# Patient Record
Sex: FEMALE
[De-identification: names, ages, dates, MRNs, and addresses within clinical notes are randomized per-mention and may not be internally consistent; named-entity substitution may affect disease eponyms.]

---

## 2018-05-22 ENCOUNTER — HOSPITAL ENCOUNTER (INPATIENT)
Dept: HOSPITAL 92 - ERS | Age: 23
LOS: 2 days | Discharge: HOME | DRG: 100 | End: 2018-05-24
Attending: INTERNAL MEDICINE | Admitting: INTERNAL MEDICINE
Payer: SELF-PAY

## 2018-05-22 VITALS — BODY MASS INDEX: 24.3 KG/M2

## 2018-05-22 DIAGNOSIS — R31.29: ICD-10-CM

## 2018-05-22 DIAGNOSIS — D72.829: ICD-10-CM

## 2018-05-22 DIAGNOSIS — G93.49: ICD-10-CM

## 2018-05-22 DIAGNOSIS — F41.9: ICD-10-CM

## 2018-05-22 DIAGNOSIS — Z90.49: ICD-10-CM

## 2018-05-22 DIAGNOSIS — Z91.14: ICD-10-CM

## 2018-05-22 DIAGNOSIS — G40.901: Primary | ICD-10-CM

## 2018-05-22 DIAGNOSIS — E87.6: ICD-10-CM

## 2018-05-22 DIAGNOSIS — E87.2: ICD-10-CM

## 2018-05-22 LAB
ALBUMIN SERPL BCG-MCNC: 3.7 G/DL (ref 3.5–5)
ALP SERPL-CCNC: 83 U/L (ref 40–150)
ALT SERPL W P-5'-P-CCNC: 11 U/L (ref 8–55)
ANION GAP SERPL CALC-SCNC: 23 MMOL/L (ref 10–20)
AST SERPL-CCNC: 15 U/L (ref 5–34)
BASOPHILS # BLD AUTO: 0.1 THOU/UL (ref 0–0.2)
BASOPHILS NFR BLD AUTO: 0.7 % (ref 0–1)
BILIRUB SERPL-MCNC: 0.2 MG/DL (ref 0.2–1.2)
BUN SERPL-MCNC: 5 MG/DL (ref 7–18.7)
CALCIUM SERPL-MCNC: 7.8 MG/DL (ref 7.8–10.44)
CARBAMAZEPINE SERPL-MCNC: 3 UG/ML (ref 4–12)
CHLORIDE SERPL-SCNC: 107 MMOL/L (ref 98–107)
CK SERPL-CCNC: 48 U/L (ref 29–168)
CO2 SERPL-SCNC: 10 MMOL/L (ref 22–29)
CREAT CL PREDICTED SERPL C-G-VRATE: 0 ML/MIN (ref 70–130)
CRYSTAL-AUWI FLAG: 0.1 (ref 0–15)
DRUG SCREEN CUTOFF: (no result)
EOSINOPHIL # BLD AUTO: 0.1 THOU/UL (ref 0–0.7)
EOSINOPHIL NFR BLD AUTO: 0.7 % (ref 0–10)
GLOBULIN SER CALC-MCNC: 3 G/DL (ref 2.4–3.5)
GLUCOSE SERPL-MCNC: 236 MG/DL (ref 70–105)
HEV IGM SER QL: 52 (ref 0–7.99)
HGB BLD-MCNC: 12.9 G/DL (ref 12–16)
HYALINE CASTS #/AREA URNS LPF: (no result) LPF
LYMPHOCYTES # BLD: 1.5 THOU/UL (ref 1.2–3.4)
LYMPHOCYTES NFR BLD AUTO: 11.5 % (ref 21–51)
MAGNESIUM SERPL-MCNC: 2.7 MG/DL (ref 1.6–2.6)
MCH RBC QN AUTO: 31.8 PG (ref 27–31)
MCV RBC AUTO: 95.3 FL (ref 81–99)
MEDTOX CONTROL LINE VALID?: (no result)
MEDTOX READER #: (no result)
MONOCYTES # BLD AUTO: 0.3 THOU/UL (ref 0.11–0.59)
MONOCYTES NFR BLD AUTO: 2.4 % (ref 0–10)
NEUTROPHILS # BLD AUTO: 11.2 THOU/UL (ref 1.4–6.5)
NEUTROPHILS NFR BLD AUTO: 84.8 % (ref 42–75)
PATHC CAST-AUWI FLAG: 0.29 (ref 0–2.49)
PLATELET # BLD AUTO: 304 THOU/UL (ref 130–400)
POTASSIUM SERPL-SCNC: 3.9 MMOL/L (ref 3.5–5.1)
PREGU CONTROL BACKGROUND?: (no result)
PREGU CONTROL BAR APPEAR?: YES
PROT UR STRIP.AUTO-MCNC: 100 MG/DL
RBC # BLD AUTO: 4.07 MILL/UL (ref 4.2–5.4)
RBC UR QL AUTO: (no result) HPF (ref 0–3)
SODIUM SERPL-SCNC: 136 MMOL/L (ref 136–145)
SP GR UR STRIP: 1.01 (ref 1–1.04)
SPERM-AUWI FLAG: 0 (ref 0–9.9)
WBC # BLD AUTO: 13.2 THOU/UL (ref 4.8–10.8)
WBC UR QL AUTO: (no result) HPF (ref 0–3)
YEAST-AUWI FLAG: 0 (ref 0–25)

## 2018-05-22 PROCEDURE — 84146 ASSAY OF PROLACTIN: CPT

## 2018-05-22 PROCEDURE — 87086 URINE CULTURE/COLONY COUNT: CPT

## 2018-05-22 PROCEDURE — 70450 CT HEAD/BRAIN W/O DYE: CPT

## 2018-05-22 PROCEDURE — 83735 ASSAY OF MAGNESIUM: CPT

## 2018-05-22 PROCEDURE — 80306 DRUG TEST PRSMV INSTRMNT: CPT

## 2018-05-22 PROCEDURE — 80048 BASIC METABOLIC PNL TOTAL CA: CPT

## 2018-05-22 PROCEDURE — 81025 URINE PREGNANCY TEST: CPT

## 2018-05-22 PROCEDURE — 96375 TX/PRO/DX INJ NEW DRUG ADDON: CPT

## 2018-05-22 PROCEDURE — 81015 MICROSCOPIC EXAM OF URINE: CPT

## 2018-05-22 PROCEDURE — 96365 THER/PROPH/DIAG IV INF INIT: CPT

## 2018-05-22 PROCEDURE — 81003 URINALYSIS AUTO W/O SCOPE: CPT

## 2018-05-22 PROCEDURE — 84443 ASSAY THYROID STIM HORMONE: CPT

## 2018-05-22 PROCEDURE — 51701 INSERT BLADDER CATHETER: CPT

## 2018-05-22 PROCEDURE — 80156 ASSAY CARBAMAZEPINE TOTAL: CPT

## 2018-05-22 PROCEDURE — 80053 COMPREHEN METABOLIC PANEL: CPT

## 2018-05-22 PROCEDURE — 83605 ASSAY OF LACTIC ACID: CPT

## 2018-05-22 PROCEDURE — 96376 TX/PRO/DX INJ SAME DRUG ADON: CPT

## 2018-05-22 PROCEDURE — 96361 HYDRATE IV INFUSION ADD-ON: CPT

## 2018-05-22 PROCEDURE — 82550 ASSAY OF CK (CPK): CPT

## 2018-05-22 PROCEDURE — 85025 COMPLETE CBC W/AUTO DIFF WBC: CPT

## 2018-05-22 PROCEDURE — 36415 COLL VENOUS BLD VENIPUNCTURE: CPT

## 2018-05-22 NOTE — HP
PRIMARY CARE PHYSICIAN:  [facility] call admission.

 

REASON FOR ADMISSION:  Status epilepticus.

 

HISTORY OF PRESENT ILLNESS:  A 23-year-old female who has underlying seizure disorder.  She was on Ke
ppra 1000 mg daily.  She ran out of medication on Sunday.  She did not take Keppra on Monday.  Last n
ight, she had a seizure.  She had total generalized tonic-clonic seizure witnessed by her  4 t
imes at home.  Paramedics was called and paramedics also had 2 seizures, one at the scene and when sh
e was brought to emergency room.  Paramedics gave her 4 mg of Ativan.  In the emergency room, she was
 loaded with Keppra.  She also had one seizure in the emergency room.  Subsequently, the patient was 
in postictal phase.  She was maintaining saturation.  The patient was relatively hypotensive and tach
ycardic.  The patient's  was present at bedside, who provided history.  The patient was not in
 position of providing any history.  She did not have any headache, fever, focal motor or sensory sym
ptoms.  She was in normal health up until last night when she went to bed.  She did not have any feve
r, chills, UTI symptoms, constipation or diarrhea.

 

REVIEW OF SYSTEMS:  All review of systems tried to review with the patient, but unable to review daquan
use of postictal phase and encephalopathy.

 

PAST MEDICAL HISTORY:  Epilepsy.

 

PAST SURGICAL HISTORY:  Cholecystectomy.

 

PAST PSYCHIATRIC HISTORY:  Anxiety disorder.

 

SOCIAL HISTORY:  The patient is  and lives at home with her .  No history of tobacco, a
lcohol or illicit drug abuse.

 

FAMILY HISTORY:  No strong family history of premature coronary artery disease, stroke or cancer.

 

ADDITIONAL INFORMATION:  The patient's  reports that her last seizure was in 2014 or 2015.  Si
nce then she was seizure free with medication.

 

ALLERGIES:  No known drug allergy.

 

CURRENT HOME MEDICATIONS:  Keppra 1000 mg p.o. daily.

 

EMERGENCY ROOM COURSE:  The patient is given Keppra 1 gram, Ativan 2 mg and subsequently Ativan 1 mg,
 IV fluid 2 liters.

 

PHYSICAL EXAMINATION:

VITAL SIGNS:  Currently, temperature 98.6, pulse 117, blood pressure 99/52, saturation 100% on facema
sk, weight 59 kg, respiratory rate 19.

GENERAL:  The patient is currently in postictal phase, hemodynamically stable.  No obvious acute dist
ress.

HEENT:  Head:  Normocephalic, atraumatic.  Eyes:  Pupils round, reactive to light.  Extraocular muscl
e intact.  ENT:  Oropharynx within normal limits.  Moist mucous membranes.  No oral lesion, no pharyn
geal erythema, no exudate.

NECK:  Supple, no JVD, no thyromegaly, no carotid bruit, no jugular venous distention.

LUNGS:  Clear to auscultation without any rhonchi or rales.

CARDIAC:  S1, S2 regular, tachycardia, no murmur, no gallop, no rub.

ABDOMEN:  Soft, bowel sounds present, nontender, nondistended.  No organomegaly, no mass, no suprapub
ic tenderness.

BACK:  Unremarkable.  No CVA tenderness.

EXTREMITIES:  Upper extremities:  Passive movement of all joints are normal.  Lower extremities:  Pas
sive movement of all joints are normal.

NEUROLOGIC:  The patient is in postictal phase.  She moves all 4 limbs.  Grossly nonfocal neurologica
l examination.  Detailed neurological examination is not possible because of encephalopathy.

SKIN:  No skin rash.

HEMATOLOGIC:  No lymphadenopathy.

PSYCHIATRIC:  Unable to assess at this point.

 

SIGNIFICANT LABS:  EKG monitor showing sinus tachycardia.  CT brain based on my review, no acute intr
acranial process.  CBC:  WBC 13.3, hemoglobin 12.9, platelet 304.  BMP:  Sodium 136, potassium 3.9, c
hloride 107, carbon dioxide 10, BUN 5, anion gap 23, creatinine 0.91, glucose 236, calcium 7.8.  LFT:
  AST 15, ALT 11, alkaline phosphatase 83, albumin 3.7.  TSH 2.78.  CK 48, magnesium 2.7.  Prolactin 
level 149.9.  Lactic acid 13.8.  Urinalysis is suggestive of blood, moderate.

 

ASSESSMENT AND PLAN:

1.  Acute encephalopathy, likely due to postictal phase.

2.  Status epilepticus.  The patient had back to back several seizures at home and currently in posti
ctal phase.  The patient ran out her Keppra, currently loaded with Keppra in the emergency room.  Jefry
rology will be consulted.  We will closely monitor in CCU for airway monitoring.  Currently, she does
 not need any intubation.  She is maintaining saturation very well on nasal cannula.  We will continu
e with the Keppra 1000 mg IV twice daily.  Further decision will defer to Neurology.  Seizure precaut
ion will be exhibited while in hospital.

3.  Anion gap metabolic acidosis, likely due to lactic acidosis secondary to status epilepticus.  The
 patient will be given IV fluid.  Does not have any source of infection.  We will repeat lactic acid 
level tomorrow.

4.  Leukocytosis, likely related with seizure.

5.  Microscopic hematuria.  We will send urine culture to rule out any infection.  At this point, we 
will avoid starting any antibiotic therapy.

6.  Deep venous thrombosis prophylaxis, Lovenox 40 mg subcutaneously daily.

7.  Gastrointestinal prophylaxis, Pepcid 20 mg IV b.i.d.

8.  Code status:  The patient is FULL CODE.  The patient's  is surrogate decision maker.

 

Disposition plan based on clinical course.  We will monitor in CCU for airway monitoring and we will 
also monitor with the cardiac monitor.  Magnesium and pregnancy level will be checked.  Prolactin miguel a
cked, which is already high.  The patient will be given IV fluid.  Currently, we will keep her n.p.o.
, but whenever she is more alert, then we will resume her regular diet.

 

Plan of care discussed with the patient's  at bedside in the emergency room.

## 2018-05-22 NOTE — CON
DATE OF CONSULTATION:  05/22/2018

 

This is a 23-year-old female.  Her  is at the bedside, gave adequate history.  He says that hi
s wife has had seizure activity since childhood.  She normally takes Keppra 500 twice a day.  She ran
 out of her medicine on Sunday, supposed to get it filled today when she started seizing early in the
 morning and had 5 seizures by that time she eventually arrived to the ER room.

 

She has received a total of 7 mg of Ativan and at least 1000 mg of Keppra IV.

 

She is now in the ICU.  I am being consulted regarding ICU stay.

 

 states that patient does not smoke or drink.  She is healthy, does not have a primary care ph
ysician, has not seen a neurologist for a period of time.

 

PAST MEDICAL HISTORY:  Pertinent for seizure disorders.  No diabetes or hypertension.

 

PAST SURGICAL HISTORY:  Cholecystectomy.  

 

ALCOHOL:  None.  

 

TOBACCO:  None

 

CHRONIC MEDICATIONS:  Keppra 1000 mg apparently once a day.

 

ALLERGIES:  None. 

 

FAMILY HISTORY:  Unremarkable.  

 

SOCIAL HISTORY:  She is a housewife.

 

REVIEW OF SYSTEMS:  Otherwise, 10 points negative.

 

PHYSICAL EXAMINATION:  

GENERAL:  The patient is lethargic, but arousable, appears to be in no distress.

VITAL SIGNS:  Pulse 93, blood pressure 97/73, sats 100%, respiratory 18.

CHEST:  Chest revealed decreased breath sounds without any wheezing.

CARDIAC:  Normal S1, S2, no gallops.

ABDOMEN:  Soft, no mass.

NEUROLOGIC:  Pupils equal.  She moves all 4 extremities.

 

LABORATORY DATA:  White count 13,000, H&H 12 and 38, platelet count 305.  Electrolytes are normal.  L
actic acid 3.8.  She had a prolactin level which was 149.  It was normal.

 

Chest x-ray was normal.  She had a CT done of the head which shows no acute hemorrhage or masses.

 

IMPRESSION:  Seizure activity, status post IV Keppra and IV Ativan.

 

PLAN:  Patient reinstated her home medication of Keppra 1000 mg twice a day.  Supportive care, IV flu
ids.

 

Continue observation in the ICU until she wakes up when stable.

 

Critical care time 40 minutes.

## 2018-05-22 NOTE — CT
PRELIMINARY REPORT/[facility]/EMERGENCY AFTER

HOURS PROCEDURE: 

 

 

EXAM:

CT Head Without Intravenous Contrast

 

EXAM DATE/TIME:

Exam ordered 5/22/2018 5:14 AM

 

CLINICAL HISTORY:

23 years old, female; Signs and symptoms; Other: Seizure; Patient HX: 24 yo f presents to ed with int
ermitted seizures that started at 3 am this morning. Ems reports pt has had a total of 5 seizures, 3 
while with ems. Ems reports seizures last 20 seconds each. Ems reports history of seizures. Pt is cur
rently unconscious.

 

TECHNIQUE:

Axial computed tomography images of the head/brain without intravenous contrast.

 

COMPARISON:

No relevant prior studies available.

 

FINDINGS:

Brain: Normal. No hemorrhage. No significant white matter disease. No edema.

Ventricles: Normal. No ventriculomegaly.

Bones/joints: Normal. No acute fracture.

Soft tissues: Normal.

Sinuses: Unremarkable as visualized. No acute sinusitis.

Mastoid air cells: Unremarkable as visualized. No mastoid effusion.

 

IMPRESSION:

No acute intracranial hemorrhage.

 

 

Thank you for allowing us to participate in the care of your patient.

Dictated and Authenticated by: Herve Durham MD

05/22/2018 6:29 AM Central Time ([place] & [place])

 

 

 

FINAL REPORT 

EMERGENCY AFTER HOURS BRAIN CT WITHOUT IV CONTRAST:

 

Date:  05/22/18 

Time:  0516 hours

 

COMPARISON:  

07/26/15. 

 

IMPRESSION: 

No mass or bleed, or other acute process. Stable from prior study. 

 

Report in agreement with preliminary report given on-call by [facility]. 

 

 

 

 

POS: OFF

## 2018-05-23 LAB
ANION GAP SERPL CALC-SCNC: 9 MMOL/L (ref 10–20)
BASOPHILS # BLD AUTO: 0 THOU/UL (ref 0–0.2)
BASOPHILS NFR BLD AUTO: 0.2 % (ref 0–1)
BUN SERPL-MCNC: (no result) MG/DL (ref 7–18.7)
CALCIUM SERPL-MCNC: 8.2 MG/DL (ref 7.8–10.44)
CHLORIDE SERPL-SCNC: 108 MMOL/L (ref 98–107)
CO2 SERPL-SCNC: 24 MMOL/L (ref 22–29)
CREAT CL PREDICTED SERPL C-G-VRATE: 130 ML/MIN (ref 70–130)
EOSINOPHIL # BLD AUTO: 0 THOU/UL (ref 0–0.7)
EOSINOPHIL NFR BLD AUTO: 0.4 % (ref 0–10)
GLUCOSE SERPL-MCNC: 95 MG/DL (ref 70–105)
HGB BLD-MCNC: 13 G/DL (ref 12–16)
LYMPHOCYTES # BLD: 1.6 THOU/UL (ref 1.2–3.4)
LYMPHOCYTES NFR BLD AUTO: 24.7 % (ref 21–51)
MCH RBC QN AUTO: 32.6 PG (ref 27–31)
MCV RBC AUTO: 91.7 FL (ref 81–99)
MONOCYTES # BLD AUTO: 0.6 THOU/UL (ref 0.11–0.59)
MONOCYTES NFR BLD AUTO: 9.8 % (ref 0–10)
NEUTROPHILS # BLD AUTO: 4.2 THOU/UL (ref 1.4–6.5)
NEUTROPHILS NFR BLD AUTO: 64.9 % (ref 42–75)
PLATELET # BLD AUTO: 233 THOU/UL (ref 130–400)
POTASSIUM SERPL-SCNC: 2.8 MMOL/L (ref 3.5–5.1)
RBC # BLD AUTO: 3.99 MILL/UL (ref 4.2–5.4)
SODIUM SERPL-SCNC: 138 MMOL/L (ref 136–145)
WBC # BLD AUTO: 6.5 THOU/UL (ref 4.8–10.8)

## 2018-05-23 RX ADMIN — Medication SCH: at 20:04

## 2018-05-23 RX ADMIN — Medication SCH ML: at 08:25

## 2018-05-23 NOTE — PRG
DATE OF SERVICE:  05/23/2018

 

SUBJECTIVE:  Awake, alert, responsive, no further seizure activity.

 

OBJECTIVE:

VITAL SIGNS:  Blood pressure is 107/70, sats 100% on room air, temperature 98.

GENERAL:  Awake, alert, responsive.

NEUROLOGIC:  Moves all 4 extremities.  No headache, no seizure activity.

CHEST:  No wheezing or crackles.

CARDIAC:  Normal S1 and S2.no gallops no murmers.

 

LABORATORY DATA:  Potassium is 2.8, otherwise electrolytes are normal and CBC 
unremarkable.

 

IMPRESSION:  Seizure activity.  Following stoppage of her seizure medication.

 

PLAN:  She can be transfer out of the ICU.

 

Pulmonary will follow at a distance.

 

MIKHAIL

## 2018-05-23 NOTE — PDOC.PN
- Subjective


Encounter Start Date: 05/23/18


Encounter Start Time: 07:30


-: old records requested/rev





Patient seen and examined for status epilepticus. She is not tolerating IV 

potassium, no seizure since admission. No new complaints. No overnight events





- Objective


Resuscitation Status: 


 











Resuscitation Status           FULL:Full Resuscitation














MAR Reviewed: Yes


Vital Signs & Weight: 


 Vital Signs (12 hours)











  Temp Pulse Resp Pulse Ox


 


 05/23/18 08:00  98.5 F  85  20  100


 


 05/23/18 04:00  99.2 F   


 


 05/23/18 00:00  98 F   








 Weight











Weight                         132 lb 11.492 oz











 Most Recent Monitor Data











Heart Rate from ECG            85


 


NIBP                           107/70


 


NIBP BP-Mean                   89


 


Respiration from ECG           14


 


SpO2                           100














I&O: 


 











 05/22/18 05/23/18 05/24/18





 06:59 06:59 06:59


 


Intake Total  1515 240


 


Output Total  2000 0


 


Balance  -485 240











Result Diagrams: 


 05/23/18 04:36





 05/23/18 04:36


EKG Reviewed by me: Yes (nsr)





Phys Exam





- Physical Examination


Constitutional: NAD


HEENT: PERRLA, moist MMs, sclera anicteric


Neck: no JVD, supple


Respiratory: no wheezing, no rales, no rhonchi


Cardiovascular: RRR, no significant murmur, no rub


Gastrointestinal: soft, non-tender, no distention, positive bowel sounds


Musculoskeletal: no edema, pulses present


Neurological: non-focal, normal sensation, moves all 4 limbs


Psychiatric: normal affect, A&O x 3


Skin: no rash, normal turgor





Dx/Plan


(1) Encephalopathy acute


Code(s): G93.40 - ENCEPHALOPATHY, UNSPECIFIED   Status: Resolved   Comment: due 

to post ictal phase, resolved now   





(2) Hypokalemia


Code(s): E87.6 - HYPOKALEMIA   Status: Acute   





(3) Status epilepticus


Code(s): G40.901 - EPILEPSY, UNSP, NOT INTRACTABLE, WITH STATUS EPILEPTICUS   

Status: Acute   Comment: controlled   





(4) Anxiety


Code(s): F41.9 - ANXIETY DISORDER, UNSPECIFIED   Status: Chronic   





(5) Epilepsy


Code(s): G40.909 - EPILEPSY, UNSP, NOT INTRACTABLE, WITHOUT STATUS EPILEPTICUS 

  Status: Chronic   





(6) Lactic acidosis


Code(s): E87.2 - ACIDOSIS   Status: Resolved   





(7) Leucocytosis


Code(s): D72.829 - ELEVATED WHITE BLOOD CELL COUNT, UNSPECIFIED   Status: 

Resolved   





- Plan


cont current plan of care





* transfer to stroke floor


* replace oral potassium


* repeat labs tomorrow


* no need of monitor


* will observe one more day, if no recurrent seizure, will consider discharge 

tomorrow


* repeat labs tomorrow


* medication reviewed as below


* symptomatic treatment


* seizure precaution.


* as per neurology, on tegretol 200 mg po bid, keppra 1500 mg po bid








Review of Systems





- Review of Systems


Eyes: negative: Pain, Vision Change, Conjunctivae Inflammation, Eyelid 

Inflammation, Redness, Other


ENT: negative: Ear Pain, Ear Discharge, Nose Pain, Nose Discharge, Nose 

Congestion, Mouth Pain, Mouth Swelling, Throat Pain, Throat Swelling, Other


Respiratory: negative: Cough, Dry, Shortness of Breath, Hemoptysis, SOB with 

Excertion, Pleuritic Pain, Sputum, Wheezing


Cardiovascular: negative: chest pain, palpitations, orthopnea, paroxysmal 

nocturnal dyspnea, edema, light headedness, other


Gastrointestinal: negative: Nausea, Vomiting, Abdominal Pain, Diarrhea, 

Constipation, Melena, Hematochezia, Other


Genitourinary: negative: Dysuria, Frequency, Incontinence, Hematuria, Retention

, Other


Musculoskeletal: negative: Neck Pain, Shoulder Pain, Arm Pain, Back Pain, Hand 

Pain, Leg Pain, Foot Pain, Other


Skin: negative: Rash, Lesions, Kip, Bruising, Other





- Medications/Allergies


Allergies/Adverse Reactions: 


 Allergies











Allergy/AdvReac Type Severity Reaction Status Date / Time


 


No Known Drug Allergies Allergy   Verified 05/06/14 03:17











Medications: 


 Current Medications





Acetaminophen (Tylenol)  650 mg PO Q4H PRN


   PRN Reason: Headache/Fever or Pain


Acetaminophen (Tylenol)  650 mg MS Q4H PRN


   PRN Reason: Headache/Fever or Pain


Al Hydroxide/Mg Hydroxide (Maalox)  30 ml PO Q6H PRN


   PRN Reason: Heartburn  or Indigestion


Bisacodyl (Dulcolax)  10 mg MS Q24H PRN


   PRN Reason: Constipation


Carbamazepine (Tegretol)  200 mg PO BID [facility]


   Last Admin: 05/23/18 08:24 Dose:  200 mg


Enoxaparin Sodium (Lovenox)  40 mg SC 0900 [facility]


   Last Admin: 05/23/18 08:25 Dose:  40 mg


Famotidine (Pepcid)  20 mg PO BID [facility]


   Last Admin: 05/23/18 08:24 Dose:  20 mg


Sodium Chloride (Normal Saline 0.9%)  1,000 mls @ 75 mls/hr IV .[id number] [facility]


   Last Admin: 05/22/18 23:38 Dose:  1,000 mls


Levetiracetam (Keppra)  1,500 mg PO BID [facility]


   Last Admin: 05/23/18 08:24 Dose:  1,500 mg


Loperamide HCl (Imodium)  2 mg PO PRN PRN


   PRN Reason: Diarrhea/Loose Stools


Magnesium Hydroxide (Milk Of Magnesium)  30 ml PO DAILYPRN PRN


   PRN Reason: Constipation


Ondansetron HCl (Zofran Odt)  4 mg PO Q6H PRN


   PRN Reason: Nausea/Vomiting


Ondansetron HCl (Zofran)  4 mg IVP Q6H PRN


   PRN Reason: Nausea/Vomiting


Potassium Chloride (K-Dur)  20 meq PO TID-[facility]


   Stop: 05/23/18 17:01


   Last Admin: 05/23/18 08:24 Dose:  20 meq


Sodium Chloride (Flush - Normal Saline)  10 ml IVF Q12HR [facility]


   Last Admin: 05/23/18 08:25 Dose:  10 ml


Sodium Chloride (Flush - Normal Saline)  10 ml IVF PRN PRN


   PRN Reason: Saline Flush

## 2018-05-24 VITALS — TEMPERATURE: 98 F | SYSTOLIC BLOOD PRESSURE: 100 MMHG | DIASTOLIC BLOOD PRESSURE: 66 MMHG

## 2018-05-24 LAB
ANION GAP SERPL CALC-SCNC: 9 MMOL/L (ref 10–20)
BUN SERPL-MCNC: 5 MG/DL (ref 7–18.7)
CALCIUM SERPL-MCNC: 8.5 MG/DL (ref 7.8–10.44)
CHLORIDE SERPL-SCNC: 108 MMOL/L (ref 98–107)
CO2 SERPL-SCNC: 25 MMOL/L (ref 22–29)
CREAT CL PREDICTED SERPL C-G-VRATE: 128 ML/MIN (ref 70–130)
GLUCOSE SERPL-MCNC: 94 MG/DL (ref 70–105)
POTASSIUM SERPL-SCNC: 3.8 MMOL/L (ref 3.5–5.1)
SODIUM SERPL-SCNC: 138 MMOL/L (ref 136–145)

## 2018-05-24 RX ADMIN — Medication SCH: at 08:51

## 2018-05-24 NOTE — PDOC.PN
- Subjective


Encounter Start Date: 05/24/18


Encounter Start Time: 07:10





Patient seen and examined for seizure. No new complaints. No overnight events





- Objective


Resuscitation Status: 


 











Resuscitation Status           FULL:Full Resuscitation














MAR Reviewed: Yes


Vital Signs & Weight: 


 Vital Signs (12 hours)











  Temp Pulse Resp BP Pulse Ox


 


 05/24/18 07:50  98.0 F  86  16  100/66  98


 


 05/24/18 04:10   83   94/58 L 


 


 05/24/18 04:00  97.8 F  85  18  85/60 L  99


 


 05/24/18 00:12     96/56 L 


 


 05/24/18 00:00  98.1 F  93  18  84/55 L  98








 Weight











Admit Weight                   2.123 oz


 


Weight                         132 lb 11.492 oz











 Most Recent Monitor Data











Heart Rate from ECG            82


 


NIBP                           97/71


 


NIBP BP-Mean                   76


 


Respiration from ECG           20


 


SpO2                           100














I&O: 


 











 05/23/18 05/24/18 05/25/18





 06:59 06:59 06:59


 


Intake Total 1515 2530 


 


Output Total 2000 275 


 


Balance -485 2255 











Result Diagrams: 


 05/23/18 04:36





 05/24/18 04:28


EKG Reviewed by me: Yes (nsr)





Phys Exam





- Physical Examination


Constitutional: NAD


HEENT: PERRLA, moist MMs, sclera anicteric


Neck: no JVD, supple


Respiratory: no wheezing, no rales, no rhonchi


Cardiovascular: RRR, no significant murmur, no rub


Gastrointestinal: soft, non-tender, no distention, positive bowel sounds


Musculoskeletal: no edema, pulses present


Neurological: non-focal, normal sensation, moves all 4 limbs


Psychiatric: normal affect, A&O x 3


Skin: no rash, normal turgor





Dx/Plan


(1) Encephalopathy acute


Code(s): G93.40 - ENCEPHALOPATHY, UNSPECIFIED   Status: Resolved   Comment: due 

to post ictal phase, resolved now   





(2) Hypokalemia


Code(s): E87.6 - HYPOKALEMIA   Status: Resolved   





(3) Status epilepticus


Code(s): G40.901 - EPILEPSY, UNSP, NOT INTRACTABLE, WITH STATUS EPILEPTICUS   

Status: Resolved   Comment: controlled   





(4) Anxiety


Code(s): F41.9 - ANXIETY DISORDER, UNSPECIFIED   Status: Chronic   





(5) Epilepsy


Code(s): G40.909 - EPILEPSY, UNSP, NOT INTRACTABLE, WITHOUT STATUS EPILEPTICUS 

  Status: Chronic   





(6) Lactic acidosis


Code(s): E87.2 - ACIDOSIS   Status: Resolved   





(7) Leucocytosis


Code(s): D72.829 - ELEVATED WHITE BLOOD CELL COUNT, UNSPECIFIED   Status: 

Resolved   





- Plan


cont current plan of care





* medication reviewed as below


* symptomatic treatment


* see discharge javier.








Review of Systems





- Review of Systems


Constitutional: negative: fever, chills, sweats, weakness, malaise, other


Eyes: negative: Pain, Vision Change, Conjunctivae Inflammation, Eyelid 

Inflammation, Redness, Other


ENT: negative: Ear Pain, Ear Discharge, Nose Pain, Nose Discharge, Nose 

Congestion, Mouth Pain, Mouth Swelling, Throat Pain, Throat Swelling, Other


Respiratory: negative: Cough, Dry, Shortness of Breath, Hemoptysis, SOB with 

Excertion, Pleuritic Pain, Sputum, Wheezing


Cardiovascular: negative: chest pain, palpitations, orthopnea, paroxysmal 

nocturnal dyspnea, edema, light headedness, other


Gastrointestinal: negative: Nausea, Vomiting, Abdominal Pain, Diarrhea, 

Constipation, Melena, Hematochezia, Other


Genitourinary: negative: Dysuria, Frequency, Incontinence, Hematuria, Retention

, Other


Musculoskeletal: negative: Neck Pain, Shoulder Pain, Arm Pain, Back Pain, Hand 

Pain, Leg Pain, Foot Pain, Other


Skin: negative: Rash, Lesions, Kip, Bruising, Other


Neurological: negative: Weakness, Numbness, Incoordination, Change in Speech, 

Confusion, Seizures, Other





- Medications/Allergies


Allergies/Adverse Reactions: 


 Allergies











Allergy/AdvReac Type Severity Reaction Status Date / Time


 


No Known Drug Allergies Allergy   Verified 05/06/14 03:17











Medications: 


 Current Medications





Acetaminophen (Tylenol)  650 mg PO Q4H PRN


   PRN Reason: Headache/Fever or Pain


Acetaminophen (Tylenol)  650 mg MI Q4H PRN


   PRN Reason: Headache/Fever or Pain


Al Hydroxide/Mg Hydroxide (Maalox)  30 ml PO Q6H PRN


   PRN Reason: Heartburn  or Indigestion


Bisacodyl (Dulcolax)  10 mg MI Q24H PRN


   PRN Reason: Constipation


Carbamazepine (Tegretol)  200 mg PO BID [facility]


   Last Admin: 05/24/18 08:50 Dose:  200 mg


Enoxaparin Sodium (Lovenox)  40 mg SC 0900 [facility]


   Last Admin: 05/24/18 08:50 Dose:  40 mg


Famotidine (Pepcid)  20 mg PO BID [facility]


   Last Admin: 05/24/18 08:50 Dose:  20 mg


Sodium Chloride (Normal Saline 0.9%)  1,000 mls @ 75 mls/hr IV .[id number] [facility]


   Last Admin: 05/24/18 04:01 Dose:  Not Given


Levetiracetam (Keppra)  1,500 mg PO BID [facility]


   Last Admin: 05/24/18 08:50 Dose:  1,500 mg


Loperamide HCl (Imodium)  2 mg PO PRN PRN


   PRN Reason: Diarrhea/Loose Stools


Magnesium Hydroxide (Milk Of Magnesium)  30 ml PO DAILYPRN PRN


   PRN Reason: Constipation


Ondansetron HCl (Zofran Odt)  4 mg PO Q6H PRN


   PRN Reason: Nausea/Vomiting


Ondansetron HCl (Zofran)  4 mg IVP Q6H PRN


   PRN Reason: Nausea/Vomiting


Sodium Chloride (Flush - Normal Saline)  10 ml IVF Q12HR [facility]


   Last Admin: 05/24/18 08:51 Dose:  Not Given


Sodium Chloride (Flush - Normal Saline)  10 ml IVF PRN PRN


   PRN Reason: Saline Flush

## 2018-11-18 ENCOUNTER — HOSPITAL ENCOUNTER (EMERGENCY)
Dept: HOSPITAL 92 - ERS | Age: 23
Discharge: HOME | End: 2018-11-18
Payer: SELF-PAY

## 2018-11-18 DIAGNOSIS — Z76.0: Primary | ICD-10-CM

## 2018-11-18 DIAGNOSIS — Z79.899: ICD-10-CM

## 2018-11-18 DIAGNOSIS — G40.909: ICD-10-CM

## 2018-11-18 DIAGNOSIS — F41.9: ICD-10-CM

## 2018-11-18 PROCEDURE — 99281 EMR DPT VST MAYX REQ PHY/QHP: CPT

## 2018-11-25 ENCOUNTER — HOSPITAL ENCOUNTER (EMERGENCY)
Dept: HOSPITAL 92 - ERS | Age: 23
Discharge: HOME | End: 2018-11-25
Payer: SELF-PAY

## 2018-11-25 DIAGNOSIS — Z79.899: ICD-10-CM

## 2018-11-25 DIAGNOSIS — R56.9: Primary | ICD-10-CM

## 2018-11-25 DIAGNOSIS — F41.9: ICD-10-CM

## 2018-11-25 PROCEDURE — 99283 EMERGENCY DEPT VISIT LOW MDM: CPT

## 2019-11-04 ENCOUNTER — HOSPITAL ENCOUNTER (EMERGENCY)
Dept: HOSPITAL 92 - ERS | Age: 24
Discharge: HOME | End: 2019-11-04
Payer: SELF-PAY

## 2019-11-04 DIAGNOSIS — J01.90: ICD-10-CM

## 2019-11-04 DIAGNOSIS — F41.9: ICD-10-CM

## 2019-11-04 DIAGNOSIS — R42: Primary | ICD-10-CM

## 2019-11-04 DIAGNOSIS — Z79.899: ICD-10-CM

## 2019-11-04 LAB
ALBUMIN SERPL BCG-MCNC: 3.6 G/DL (ref 3.5–5)
ALP SERPL-CCNC: 80 U/L (ref 40–110)
ALT SERPL W P-5'-P-CCNC: 13 U/L (ref 8–55)
ANION GAP SERPL CALC-SCNC: 10 MMOL/L (ref 10–20)
AST SERPL-CCNC: 12 U/L (ref 5–34)
BASOPHILS # BLD AUTO: 0 THOU/UL (ref 0–0.2)
BASOPHILS NFR BLD AUTO: 0.6 % (ref 0–1)
BILIRUB SERPL-MCNC: 0.4 MG/DL (ref 0.2–1.2)
BUN SERPL-MCNC: 9 MG/DL (ref 7–18.7)
CALCIUM SERPL-MCNC: 8 MG/DL (ref 7.8–10.44)
CHLORIDE SERPL-SCNC: 104 MMOL/L (ref 98–107)
CO2 SERPL-SCNC: 25 MMOL/L (ref 22–29)
CREAT CL PREDICTED SERPL C-G-VRATE: 0 ML/MIN (ref 70–130)
EOSINOPHIL # BLD AUTO: 0.1 THOU/UL (ref 0–0.7)
EOSINOPHIL NFR BLD AUTO: 1.2 % (ref 0–10)
GLOBULIN SER CALC-MCNC: 2.9 G/DL (ref 2.4–3.5)
GLUCOSE SERPL-MCNC: 89 MG/DL (ref 70–105)
HGB BLD-MCNC: 12.1 G/DL (ref 12–16)
LYMPHOCYTES # BLD: 1.8 THOU/UL (ref 1.2–3.4)
LYMPHOCYTES NFR BLD AUTO: 31.2 % (ref 21–51)
MCH RBC QN AUTO: 32.8 PG (ref 27–31)
MCV RBC AUTO: 94 FL (ref 78–98)
MONOCYTES # BLD AUTO: 0.4 THOU/UL (ref 0.11–0.59)
MONOCYTES NFR BLD AUTO: 7.3 % (ref 0–10)
NEUTROPHILS # BLD AUTO: 3.4 THOU/UL (ref 1.4–6.5)
NEUTROPHILS NFR BLD AUTO: 59.7 % (ref 42–75)
PLATELET # BLD AUTO: 211 THOU/UL (ref 130–400)
POTASSIUM SERPL-SCNC: 4.1 MMOL/L (ref 3.5–5.1)
PREGS CONTROL BACKGROUND?: (no result)
PREGS CONTROL BAR APPEAR?: YES
RBC # BLD AUTO: 3.7 MILL/UL (ref 4.2–5.4)
SODIUM SERPL-SCNC: 135 MMOL/L (ref 136–145)
WBC # BLD AUTO: 5.8 THOU/UL (ref 4.8–10.8)

## 2019-11-04 PROCEDURE — 85025 COMPLETE CBC W/AUTO DIFF WBC: CPT

## 2019-11-04 PROCEDURE — 93005 ELECTROCARDIOGRAM TRACING: CPT

## 2019-11-04 PROCEDURE — 96360 HYDRATION IV INFUSION INIT: CPT

## 2019-11-04 PROCEDURE — 84703 CHORIONIC GONADOTROPIN ASSAY: CPT

## 2019-11-04 PROCEDURE — 36415 COLL VENOUS BLD VENIPUNCTURE: CPT

## 2019-11-04 PROCEDURE — 80053 COMPREHEN METABOLIC PANEL: CPT
